# Patient Record
Sex: MALE | Race: WHITE | NOT HISPANIC OR LATINO | URBAN - METROPOLITAN AREA
[De-identification: names, ages, dates, MRNs, and addresses within clinical notes are randomized per-mention and may not be internally consistent; named-entity substitution may affect disease eponyms.]

---

## 2021-01-29 ENCOUNTER — RECORDS - HEALTHEAST (OUTPATIENT)
Dept: LAB | Facility: CLINIC | Age: 66
End: 2021-01-29

## 2021-01-29 LAB
ANION GAP SERPL CALCULATED.3IONS-SCNC: 13 MMOL/L (ref 5–18)
BUN SERPL-MCNC: 20 MG/DL (ref 8–22)
CALCIUM SERPL-MCNC: 9.2 MG/DL (ref 8.5–10.5)
CHLORIDE BLD-SCNC: 95 MMOL/L (ref 98–107)
CHOLEST SERPL-MCNC: 232 MG/DL
CO2 SERPL-SCNC: 24 MMOL/L (ref 22–31)
CREAT SERPL-MCNC: 1.09 MG/DL (ref 0.7–1.3)
FASTING STATUS PATIENT QL REPORTED: ABNORMAL
GFR SERPL CREATININE-BSD FRML MDRD: >60 ML/MIN/1.73M2
GLUCOSE BLD-MCNC: 437 MG/DL (ref 70–125)
HDLC SERPL-MCNC: 54 MG/DL
LDLC SERPL CALC-MCNC: 117 MG/DL
POTASSIUM BLD-SCNC: 4.1 MMOL/L (ref 3.5–5)
SODIUM SERPL-SCNC: 132 MMOL/L (ref 136–145)
TRIGL SERPL-MCNC: 303 MG/DL

## 2021-02-04 ENCOUNTER — RECORDS - HEALTHEAST (OUTPATIENT)
Dept: ADMINISTRATIVE | Facility: OTHER | Age: 66
End: 2021-02-04

## 2021-02-06 ENCOUNTER — COMMUNICATION - HEALTHEAST (OUTPATIENT)
Dept: SCHEDULING | Facility: CLINIC | Age: 66
End: 2021-02-06

## 2021-02-08 ENCOUNTER — AMBULATORY - HEALTHEAST (OUTPATIENT)
Dept: PULMONOLOGY | Facility: OTHER | Age: 66
End: 2021-02-08

## 2021-02-08 DIAGNOSIS — I26.99 PULMONARY EMBOLISM (H): ICD-10-CM

## 2021-02-11 ENCOUNTER — RECORDS - HEALTHEAST (OUTPATIENT)
Dept: LAB | Facility: CLINIC | Age: 66
End: 2021-02-11

## 2021-02-11 ENCOUNTER — RECORDS - HEALTHEAST (OUTPATIENT)
Dept: ADMINISTRATIVE | Facility: OTHER | Age: 66
End: 2021-02-11

## 2021-02-11 LAB
ANION GAP SERPL CALCULATED.3IONS-SCNC: 9 MMOL/L (ref 5–18)
BUN SERPL-MCNC: 12 MG/DL (ref 8–22)
CALCIUM SERPL-MCNC: 9.2 MG/DL (ref 8.5–10.5)
CHLORIDE BLD-SCNC: 101 MMOL/L (ref 98–107)
CO2 SERPL-SCNC: 26 MMOL/L (ref 22–31)
CREAT SERPL-MCNC: 0.85 MG/DL (ref 0.7–1.3)
GFR SERPL CREATININE-BSD FRML MDRD: >60 ML/MIN/1.73M2
GLUCOSE BLD-MCNC: 220 MG/DL (ref 70–125)
POTASSIUM BLD-SCNC: 4.3 MMOL/L (ref 3.5–5)
PSA SERPL-MCNC: 3.2 NG/ML (ref 0–4.5)
SODIUM SERPL-SCNC: 136 MMOL/L (ref 136–145)

## 2021-03-08 ENCOUNTER — RECORDS - HEALTHEAST (OUTPATIENT)
Dept: ADMINISTRATIVE | Facility: OTHER | Age: 66
End: 2021-03-08

## 2021-03-16 ENCOUNTER — RECORDS - HEALTHEAST (OUTPATIENT)
Dept: LAB | Facility: CLINIC | Age: 66
End: 2021-03-16

## 2021-03-16 LAB
ANION GAP SERPL CALCULATED.3IONS-SCNC: 10 MMOL/L (ref 5–18)
BUN SERPL-MCNC: 17 MG/DL (ref 8–22)
CALCIUM SERPL-MCNC: 9 MG/DL (ref 8.5–10.5)
CHLORIDE BLD-SCNC: 101 MMOL/L (ref 98–107)
CHOLEST SERPL-MCNC: 102 MG/DL
CO2 SERPL-SCNC: 26 MMOL/L (ref 22–31)
CREAT SERPL-MCNC: 0.82 MG/DL (ref 0.7–1.3)
CREAT UR-MCNC: 79 MG/DL
FASTING STATUS PATIENT QL REPORTED: NORMAL
GFR SERPL CREATININE-BSD FRML MDRD: >60 ML/MIN/1.73M2
GLUCOSE BLD-MCNC: 211 MG/DL (ref 70–125)
HDLC SERPL-MCNC: 48 MG/DL
LDLC SERPL CALC-MCNC: 39 MG/DL
MICROALBUMIN UR-MCNC: 0.95 MG/DL (ref 0–1.99)
MICROALBUMIN/CREAT UR: 12 MG/G
POTASSIUM BLD-SCNC: 4.1 MMOL/L (ref 3.5–5)
SODIUM SERPL-SCNC: 137 MMOL/L (ref 136–145)
TRIGL SERPL-MCNC: 73 MG/DL

## 2021-04-12 ENCOUNTER — HOSPITAL ENCOUNTER (OUTPATIENT)
Dept: CARDIOLOGY | Facility: HOSPITAL | Age: 66
Discharge: HOME | End: 2021-04-12
Attending: INTERNAL MEDICINE

## 2021-04-12 DIAGNOSIS — I26.99 PULMONARY EMBOLISM (H): ICD-10-CM

## 2021-04-12 LAB
AORTIC ROOT: 4.1 CM
AORTIC VALVE MEAN VELOCITY: 90.7 CM/S
ASCENDING AORTA: 3.9 CM
AV DIMENSIONLESS INDEX VTI: 0.8
AV MEAN GRADIENT: 4 MMHG
AV PEAK GRADIENT: 7.6 MMHG
AV VALVE AREA: 3.1 CM2
AV VELOCITY RATIO: 0.8
BSA FOR ECHO PROCEDURE: 1.98 M2
CV BLOOD PRESSURE: ABNORMAL MMHG
CV ECHO HEIGHT: 70 IN
CV ECHO WEIGHT: 175 LBS
DOP CALC AO PEAK VEL: 138 CM/S
DOP CALC AO VTI: 25.6 CM
DOP CALC LVOT AREA: 3.8 CM2
DOP CALC LVOT DIAMETER: 2.2 CM
DOP CALC LVOT PEAK VEL: 114 CM/S
DOP CALC LVOT STROKE VOLUME: 80.2 CM3
DOP CALCLVOT PEAK VEL VTI: 21.1 CM
EJECTION FRACTION: 65 % (ref 55–75)
FRACTIONAL SHORTENING: 37.2 % (ref 28–44)
INTERVENTRICULAR SEPTUM IN END DIASTOLE: 1.1 CM (ref 0.6–1)
IVS/PW RATIO: 1.4
LA AREA 1: 16 CM2
LA AREA 2: 15.6 CM2
LEFT ATRIUM AREA: 16 CM2
LEFT ATRIUM LENGTH: 5.05 CM
LEFT ATRIUM SIZE: 3 CM
LEFT ATRIUM VOLUME INDEX: 21.2 ML/M2
LEFT ATRIUM VOLUME: 42 ML
LEFT VENTRICLE CARDIAC INDEX: 3.3 L/MIN/M2
LEFT VENTRICLE CARDIAC OUTPUT: 6.5 L/MIN
LEFT VENTRICLE DIASTOLIC VOLUME INDEX: 42.4 CM3/M2 (ref 34–74)
LEFT VENTRICLE DIASTOLIC VOLUME: 84 CM3 (ref 62–150)
LEFT VENTRICLE HEART RATE: 81 BPM
LEFT VENTRICLE MASS INDEX: 67 G/M2
LEFT VENTRICLE SYSTOLIC VOLUME INDEX: 14.6 CM3/M2 (ref 11–31)
LEFT VENTRICLE SYSTOLIC VOLUME: 29 CM3 (ref 21–61)
LEFT VENTRICULAR INTERNAL DIMENSION IN DIASTOLE: 4.3 CM (ref 4.2–5.8)
LEFT VENTRICULAR INTERNAL DIMENSION IN SYSTOLE: 2.7 CM (ref 2.5–4)
LEFT VENTRICULAR MASS: 132.7 G
LEFT VENTRICULAR OUTFLOW TRACT MEAN GRADIENT: 2 MMHG
LEFT VENTRICULAR OUTFLOW TRACT MEAN VELOCITY: 68.2 CM/S
LEFT VENTRICULAR OUTFLOW TRACT PEAK GRADIENT: 5 MMHG
LEFT VENTRICULAR POSTERIOR WALL IN END DIASTOLE: 0.8 CM (ref 0.6–1)
LV STROKE VOLUME INDEX: 40.5 ML/M2
MITRAL VALVE E/A RATIO: 0.7
MV DECELERATION TIME: 208 MS
MV PEAK A VELOCITY: 77 CM/S
MV PEAK E VELOCITY: 51.3 CM/S
NUC REST DIASTOLIC VOLUME INDEX: 2800 LBS
NUC REST SYSTOLIC VOLUME INDEX: 70 IN
TRICUSPID REGURGITATION PEAK PRESSURE GRADIENT: 27.9 MMHG
TRICUSPID VALVE ANULAR PLANE SYSTOLIC EXCURSION: 1.8 CM
TRICUSPID VALVE PEAK REGURGITANT VELOCITY: 264 CM/S

## 2021-04-12 RX ORDER — LISINOPRIL 10 MG/1
10 TABLET ORAL DAILY
Status: SHIPPED | COMMUNITY
Start: 2021-02-11 | End: 2021-10-12 | Stop reason: SINTOL

## 2021-04-12 ASSESSMENT — MIFFLIN-ST. JEOR: SCORE: 1580.04

## 2021-04-19 ENCOUNTER — OFFICE VISIT - HEALTHEAST (OUTPATIENT)
Dept: PULMONOLOGY | Facility: OTHER | Age: 66
End: 2021-04-19

## 2021-04-19 DIAGNOSIS — I26.02 ACUTE SADDLE PULMONARY EMBOLISM WITH ACUTE COR PULMONALE (H): ICD-10-CM

## 2021-04-19 ASSESSMENT — MIFFLIN-ST. JEOR: SCORE: 1575.51

## 2021-06-05 VITALS
SYSTOLIC BLOOD PRESSURE: 116 MMHG | DIASTOLIC BLOOD PRESSURE: 70 MMHG | HEART RATE: 79 BPM | BODY MASS INDEX: 24.91 KG/M2 | HEIGHT: 70 IN | WEIGHT: 174 LBS | OXYGEN SATURATION: 99 %

## 2021-06-05 VITALS — BODY MASS INDEX: 25.05 KG/M2 | WEIGHT: 175 LBS | HEIGHT: 70 IN

## 2021-06-16 PROBLEM — I10 ESSENTIAL HYPERTENSION: Status: ACTIVE | Noted: 2021-04-12

## 2021-06-16 PROBLEM — I82.409 DEEP VENOUS THROMBOSIS OF LOWER EXTREMITY (H): Status: ACTIVE | Noted: 2021-04-12

## 2021-06-16 PROBLEM — E78.2 MIXED HYPERLIPIDEMIA: Status: ACTIVE | Noted: 2021-04-12

## 2021-06-16 PROBLEM — I26.99 BILATERAL PULMONARY EMBOLISM (H): Status: ACTIVE | Noted: 2021-02-04

## 2021-06-16 NOTE — PROGRESS NOTES
Assessment and Plan:Albert Forte is a 65 y.o. male with a past medical history significant for diabetes who presents to clinic today for follow up of a massive pulmonary embolism with pulmonary hypertesnion in February 2021.  His echo shows complete resolution of his pulmonary hypertension.  Presumably his clot was unprovoked however he is going to start a workup for colon cancer as his cologuard was positive.  I think at this point, its best to assume this was unprovoked until this is determined with biopsy.  In this case he should consider remaining on the blood thinner a minimum of 6-12 months, with a strong consideration of lifelong in a person with minimal risk of traumatic bleeding.    1) Pulmonary embolism - safe to assume this is resolved with 3 months of therapy, minimal symptoms, and no pulmonary hypertension on his ECHO  2) DVT status - this is also resolved, but it is not clear if provoked.  At a minimum of 3 months he can undergo a colonscopy with temporary suspension of the blood thinner for 3-5 days depending on the proceduralist.    3) Hypercoaguable workup - his Factor V leiden and Factor 2 were both negative.  If we decide at some point to stop the blood thinner we can consider protein C and S functional assays.  4) RTC in 6 months to reconvene - stay on the blood thinner until then, with holding for procedures as necessary..       CCx: pulmonary embolism    HPI: Mr. Forte is a 65 year old male with a new history of diabetes who presents for evaluation of a pulmonary embolism.  He was hospitalized Feb 4 to Feb 7 for a DVT and PE that was bilateral.  This resulted in pulmonary hypertension and he was treated with site directed lytic therapy.  He was not hypoxic.  He was ultmately discharged on Xarelto.  He has since recovered his symptoms essentially to baseline.  At his worse, he was dyspneic walking across the room, and now he has no limitations.  He had no history of travel, traumatic  injury to his leg, a known cancer diagnosis, or family history of clot or sudden, unexplained deaths.  He is a non smoker.  He has no physical hobbies that could result in injury and he is not receiving bruises while on the Xarelto.  He did have a cologuard that was positive AND he would like to have his cataracts fixed which got worse with his recent diabetes diagnosis.    ROS:  Review of Systems - History obtained from the patient  General ROS: negative, some weight loss  Psychological ROS: negative  ENT ROS: cataracts  Allergy and Immunology ROS: negative  Endocrine ROS: negative  Respiratory ROS: positive for -  negative for - cough, hemoptysis, orthopnea, pleuritic pain, shortness of breath, sputum changes, stridor, tachypnea or wheezing  Cardiovascular ROS: no chest pain or palpitations  Gastrointestinal ROS: no abdominal pain, change in bowel habits, or black or bloody stools  Genito-Urinary ROS: no dysuria, trouble voiding, or hematuria  Musculoskeletal ROS: negative  Neurological ROS: no TIA or stroke symptoms  Dermatological ROS:  Some itchiness since starting lisinopril and metformin    Family history  No family history of blood clots    Social history  Retired, , no smoking history    Current Meds:  Current Outpatient Medications   Medication Sig     glimepiride (AMARYL) 4 MG tablet Take 4 mg by mouth daily.     lisinopriL (PRINIVIL,ZESTRIL) 10 MG tablet Take 10 mg by mouth daily.     metFORMIN (GLUCOPHAGE) 500 MG tablet Take 2 tablets (1,000 mg total) by mouth 2 (two) times a day with meals. (Patient taking differently: Take by mouth Daily after breakfast. )     rivaroxaban ANTICOAGULANT (XARELTO) 15 mg (42)- 20 mg (9) dosepak Take 1 tablet (15mg) po bid for 21 days then take 1 tablet (20mg) daily       Labs:  No results found for this or any previous visit (from the past 72 hour(s)).    I have personally reviewed all pertinent imaging studies and PFT results unless otherwise noted.    Imaging  "studies:  Echo Complete    Result Date: 4/12/2021    Left ventricle ejection fraction is normal. The calculated left ventricular ejection fraction is 65% without wall motion abnormality.   Normal right ventricular size and systolic function.   No significant valve disease.   When compared to the previous study dated 2/5/2021, right ventricular systolic function has normalized. There is no evidence of pulmonary hypertension on the current study.      Patient Name: ERASMO MEIER   MR#: [id number]   Specimen #: [id number]   Collected: 2/6/2021 17:17   Received: 2/8/2021 10:07   Reported: 2/11/2021 15:49   Ordering Phy(s): VAMSI GLORIA     _________________________________________     TEST(S) REQUESTED:     Factor 5 Leiden and Factor 2 by PCR     SPECIMEN DESCRIPTION:   Blood     RESULTS:     Factor V 1691G>A (Leiden)  RESULTS:   Mutation analyzed:     1691G>A   Factor V 1691G>A (Leiden)  Interpretation:      ABSENT   Factor V 1691G>A (Leiden) mutation  genotype:      G/G     FACTOR 2/PROTHROMBIN RESULTS:   Mutation analyzed:     [id number]   Factor 2 Mutation Interpretation:      ABSENT   Factor 2 Mutation genotype:      G/G     INTERPRETATION:   The patient is negative for the Factor V 1691G>A (Leiden) and negative for the Factor 2 mutation.       Physical Exam:  /70   Pulse 79   Ht 5' 10\" (1.778 m)   Wt 174 lb (78.9 kg)   SpO2 99%   BMI 24.97 kg/m    General - Well nourished  Ears/Mouth -  Deferred given mask use during pandemic  Neck - no cervical lymphadenopathy  Lungs - Clear to ausculation bilaterally   CVS - regular rhythm with no murmurs, rubs or gallups  Abdomen - soft, NT, ND, NABS  Ext - no cyanosis, clubbing or edema  Skin - no rash  Psychology - alert and oriented, answers appropriate        Electronically signed by:    Angel Andino MD PhD  [facility] Pulmonary and Critical Care Medicine  "

## 2021-06-16 NOTE — PATIENT INSTRUCTIONS - HE
1) Your heart is functioning normally now.  This means there has been resolution of the pulmonary hypertension you had before  2) We don't for sure know what caused this clot.  If you have cancer, it could be that, but we can't be sure until you have your colonoscopy.  3) Since you have a low risk profile for bleeding, it may be safer to stay on the blood thinner to prevent future clots unless we have a clear cause that can be reversed (like cancer maybe).    4) You should proceed with the colonoscopy at a minimum of 3 months out from starting the blood thinner.  It may be safer to wait 4, but I wouldn't put off the colonoscopy too long.  5) I will check back in after another 6 months from now to discuss what to do with your blood thinner  6) The factor 2 and Factor V leiden tests were both negative.

## 2021-06-21 ENCOUNTER — RECORDS - HEALTHEAST (OUTPATIENT)
Dept: LAB | Facility: CLINIC | Age: 66
End: 2021-06-21

## 2021-06-21 NOTE — LETTER
Letter by Angel Andino MD at      Author: Angel Andino MD Service: -- Author Type: --    Filed:  Encounter Date: 4/19/2021 Status: (Other)         Migue Mendoza MD  [address]  [facility] [address]                                  April 19, 2021    Patient: Albert Forte   MR Number: [id number]   YOB: 1955   Date of Visit: 4/19/2021     Dear Dr. Kelly MD:    Thank you for referring Albert Forte to me for evaluation. Below are the relevant portions of my assessment and plan of care.    If you have questions, please do not hesitate to call me. I look forward to following Albert along with you.    Sincerely,        Angel Andino MD          CC  No Recipients  Angel Andino MD  4/19/2021  2:20 PM  Sign when Signing Visit  Assessment and Plan:Albert Forte is a 65 y.o. male with a past medical history significant for diabetes who presents to clinic today for follow up of a massive pulmonary embolism with pulmonary hypertesnion in February 2021.  His echo shows complete resolution of his pulmonary hypertension.  Presumably his clot was unprovoked however he is going to start a workup for colon cancer as his cologuard was positive.  I think at this point, its best to assume this was unprovoked until this is determined with biopsy.  In this case he should consider remaining on the blood thinner a minimum of 6-12 months, with a strong consideration of lifelong in a person with minimal risk of traumatic bleeding.    1) Pulmonary embolism - safe to assume this is resolved with 3 months of therapy, minimal symptoms, and no pulmonary hypertension on his ECHO  2) DVT status - this is also resolved, but it is not clear if provoked.  At a minimum of 3 months he can undergo a colonscopy with temporary suspension of the blood thinner for 3-5 days depending on the proceduralist.    3) Hypercoaguable workup - his Factor V leiden and Factor 2 were both  negative.  If we decide at some point to stop the blood thinner we can consider protein C and S functional assays.  4) RTC in 6 months to reconvene - stay on the blood thinner until then, with holding for procedures as necessary..       CCx: pulmonary embolism    HPI: Mr. Forte is a 65 year old male with a new history of diabetes who presents for evaluation of a pulmonary embolism.  He was hospitalized Feb 4 to Feb 7 for a DVT and PE that was bilateral.  This resulted in pulmonary hypertension and he was treated with site directed lytic therapy.  He was not hypoxic.  He was ultmately discharged on Xarelto.  He has since recovered his symptoms essentially to baseline.  At his worse, he was dyspneic walking across the room, and now he has no limitations.  He had no history of travel, traumatic injury to his leg, a known cancer diagnosis, or family history of clot or sudden, unexplained deaths.  He is a non smoker.  He has no physical hobbies that could result in injury and he is not receiving bruises while on the Xarelto.  He did have a cologuard that was positive AND he would like to have his cataracts fixed which got worse with his recent diabetes diagnosis.    ROS:  Review of Systems - History obtained from the patient  General ROS: negative, some weight loss  Psychological ROS: negative  ENT ROS: cataracts  Allergy and Immunology ROS: negative  Endocrine ROS: negative  Respiratory ROS: positive for -  negative for - cough, hemoptysis, orthopnea, pleuritic pain, shortness of breath, sputum changes, stridor, tachypnea or wheezing  Cardiovascular ROS: no chest pain or palpitations  Gastrointestinal ROS: no abdominal pain, change in bowel habits, or black or bloody stools  Genito-Urinary ROS: no dysuria, trouble voiding, or hematuria  Musculoskeletal ROS: negative  Neurological ROS: no TIA or stroke symptoms  Dermatological ROS:  Some itchiness since starting lisinopril and metformin    Family history  No family  history of blood clots    Social history  Retired, , no smoking history    Current Meds:  Current Outpatient Medications   Medication Sig   ? glimepiride (AMARYL) 4 MG tablet Take 4 mg by mouth daily.   ? lisinopriL (PRINIVIL,ZESTRIL) 10 MG tablet Take 10 mg by mouth daily.   ? metFORMIN (GLUCOPHAGE) 500 MG tablet Take 2 tablets (1,000 mg total) by mouth 2 (two) times a day with meals. (Patient taking differently: Take by mouth Daily after breakfast. )   ? rivaroxaban ANTICOAGULANT (XARELTO) 15 mg (42)- 20 mg (9) dosepak Take 1 tablet (15mg) po bid for 21 days then take 1 tablet (20mg) daily       Labs:  No results found for this or any previous visit (from the past 72 hour(s)).    I have personally reviewed all pertinent imaging studies and PFT results unless otherwise noted.    Imaging studies:  Echo Complete    Result Date: 4/12/2021    Left ventricle ejection fraction is normal. The calculated left ventricular ejection fraction is 65% without wall motion abnormality.   Normal right ventricular size and systolic function.   No significant valve disease.   When compared to the previous study dated 2/5/2021, right ventricular systolic function has normalized. There is no evidence of pulmonary hypertension on the current study.      Patient Name: ERASMO MEIER   MR#: [id number]   Specimen #: [id number]   Collected: 2/6/2021 17:17   Received: 2/8/2021 10:07   Reported: 2/11/2021 15:49   Ordering Phy(s): VAMSI GLORIA     _________________________________________     TEST(S) REQUESTED:     Factor 5 Leiden and Factor 2 by PCR     SPECIMEN DESCRIPTION:   Blood     RESULTS:     Factor V 1691G>A (Leiden)  RESULTS:   Mutation analyzed:     1691G>A   Factor V 1691G>A (Leiden)  Interpretation:      ABSENT   Factor V 1691G>A (Leiden) mutation  genotype:      G/G     FACTOR 2/PROTHROMBIN RESULTS:   Mutation analyzed:     [id number]   Factor 2 Mutation Interpretation:      ABSENT   Factor 2 Mutation genotype:      " G/G     INTERPRETATION:   The patient is negative for the Factor V 1691G>A (Leiden) and negative for the Factor 2 mutation.       Physical Exam:  /70   Pulse 79   Ht 5' 10\" (1.778 m)   Wt 174 lb (78.9 kg)   SpO2 99%   BMI 24.97 kg/m    General - Well nourished  Ears/Mouth -  Deferred given mask use during pandemic  Neck - no cervical lymphadenopathy  Lungs - Clear to ausculation bilaterally   CVS - regular rhythm with no murmurs, rubs or gallups  Abdomen - soft, NT, ND, NABS  Ext - no cyanosis, clubbing or edema  Skin - no rash  Psychology - alert and oriented, answers appropriate        Electronically signed by:    Angel Andino MD PhD  [facility] Pulmonary and Critical Care Medicine         "

## 2021-06-22 LAB
ALBUMIN SERPL-MCNC: 3.6 G/DL (ref 3.5–5)
ALP SERPL-CCNC: 74 U/L (ref 45–120)
ALT SERPL W P-5'-P-CCNC: 24 U/L (ref 0–45)
ANION GAP SERPL CALCULATED.3IONS-SCNC: 11 MMOL/L (ref 5–18)
AST SERPL W P-5'-P-CCNC: 17 U/L (ref 0–40)
BASOPHILS # BLD AUTO: 0 THOU/UL (ref 0–0.2)
BASOPHILS NFR BLD AUTO: 1 % (ref 0–2)
BILIRUB SERPL-MCNC: 0.3 MG/DL (ref 0–1)
BUN SERPL-MCNC: 22 MG/DL (ref 8–22)
CALCIUM SERPL-MCNC: 9.4 MG/DL (ref 8.5–10.5)
CHLORIDE BLD-SCNC: 101 MMOL/L (ref 98–107)
CO2 SERPL-SCNC: 27 MMOL/L (ref 22–31)
CREAT SERPL-MCNC: 0.77 MG/DL (ref 0.7–1.3)
EOSINOPHIL # BLD AUTO: 0.3 THOU/UL (ref 0–0.4)
EOSINOPHIL NFR BLD AUTO: 4 % (ref 0–6)
ERYTHROCYTE [DISTWIDTH] IN BLOOD BY AUTOMATED COUNT: 12.9 % (ref 11–14.5)
GFR SERPL CREATININE-BSD FRML MDRD: >60 ML/MIN/1.73M2
GLUCOSE BLD-MCNC: 90 MG/DL (ref 70–125)
HCT VFR BLD AUTO: 41.8 % (ref 40–54)
HGB BLD-MCNC: 14.2 G/DL (ref 14–18)
IMM GRANULOCYTES # BLD: 0 THOU/UL
IMM GRANULOCYTES NFR BLD: 0 %
LYMPHOCYTES # BLD AUTO: 1.7 THOU/UL (ref 0.8–4.4)
LYMPHOCYTES NFR BLD AUTO: 23 % (ref 20–40)
MCH RBC QN AUTO: 29.7 PG (ref 27–34)
MCHC RBC AUTO-ENTMCNC: 34 G/DL (ref 32–36)
MCV RBC AUTO: 87 FL (ref 80–100)
MONOCYTES # BLD AUTO: 0.7 THOU/UL (ref 0–0.9)
MONOCYTES NFR BLD AUTO: 9 % (ref 2–10)
NEUTROPHILS # BLD AUTO: 4.5 THOU/UL (ref 2–7.7)
NEUTROPHILS NFR BLD AUTO: 62 % (ref 50–70)
PLATELET # BLD AUTO: 230 THOU/UL (ref 140–440)
PMV BLD AUTO: 10.3 FL (ref 8.5–12.5)
POTASSIUM BLD-SCNC: 4.3 MMOL/L (ref 3.5–5)
PROT SERPL-MCNC: 6.6 G/DL (ref 6–8)
RBC # BLD AUTO: 4.78 MILL/UL (ref 4.4–6.2)
SODIUM SERPL-SCNC: 139 MMOL/L (ref 136–145)
WBC: 7.2 THOU/UL (ref 4–11)

## 2021-10-12 ENCOUNTER — OFFICE VISIT (OUTPATIENT)
Dept: PULMONOLOGY | Facility: OTHER | Age: 66
End: 2021-10-12
Payer: MEDICARE

## 2021-10-12 VITALS — OXYGEN SATURATION: 98 % | HEART RATE: 78 BPM | RESPIRATION RATE: 18 BRPM

## 2021-10-12 DIAGNOSIS — I26.99 BILATERAL PULMONARY EMBOLISM (H): ICD-10-CM

## 2021-10-12 PROCEDURE — 99213 OFFICE O/P EST LOW 20 MIN: CPT | Performed by: INTERNAL MEDICINE

## 2021-10-12 RX ORDER — LOSARTAN POTASSIUM 25 MG/1
25 TABLET ORAL DAILY
COMMUNITY

## 2021-10-12 NOTE — PROGRESS NOTES
Assessment and Plan:Albert Forte is a 66 year old male with a past medical history significant for bilateral pulmonary emboli and DVT who presents to clinic today for follow up.. He remains asymptomatic.  His clot was considered unprovoked.  He remains at low risk for bleeding on a blood thinner.  He would like to stay on this indefinitely which I agree with.    1) Hx of DVT/PE - unprovoked, remains at risk for recurrence.  Xarelto 20mg daily indefinitely.  Can stop 3 days in advance of procedures without bridging.  2) He can follow with Dr. Mendoza for refills from here.  3) Happy to see him back with any concerns.        CCx: Hx of DVT/PE    HPI: Mr. Forte is a 66 year old male who presents to discuss his treatment for a pulmonary embolism diagnosed in Feb 2021.  He initially had signs of pulmonary hypertension but this had resolved by his April clinic visit.  He has remained on Xarelto since that time.  Today he states he is doing great.  He is now more active and has regained some weight since having his diabetes stabilized and his diet modified.  He has no bruising or bleeding.  He had a colonoscopy off of his xarelto with multiple polyps removed.     ROS:  Review of Systems - History obtained from the patient  General ROS: negative  Psychological ROS: negative  ENT ROS: negative  Allergy and Immunology ROS: negative  Endocrine ROS: negative  Respiratory ROS: negative for - cough, hemoptysis, orthopnea, pleuritic pain, shortness of breath, sputum changes, stridor, tachypnea or wheezing  Cardiovascular ROS: no chest pain or palpitations  Gastrointestinal ROS: no abdominal pain, change in bowel habits, or black or bloody stools  Genito-Urinary ROS: no dysuria, trouble voiding, or hematuria  Musculoskeletal ROS: negative  Neurological ROS: no TIA or stroke symptoms  Dermatological ROS: negative      Current Meds:  Current Outpatient Medications   Medication Sig Dispense Refill     glimepiride (AMARYL) 4  MG tablet [GLIMEPIRIDE (AMARYL) 4 MG TABLET] Take 4 mg by mouth daily.       lisinopriL (PRINIVIL,ZESTRIL) 10 MG tablet [LISINOPRIL (PRINIVIL,ZESTRIL) 10 MG TABLET] Take 10 mg by mouth daily.       metFORMIN (GLUCOPHAGE) 500 MG tablet [METFORMIN (GLUCOPHAGE) 500 MG TABLET] Take 2 tablets (1,000 mg total) by mouth 2 (two) times a day with meals.  0     rivaroxaban ANTICOAGULANT (XARELTO) 15 mg (42)- 20 mg (9) dosepak [RIVAROXABAN ANTICOAGULANT (XARELTO) 15 MG (42)- 20 MG (9) DOSEPAK] Take 1 tablet (15mg) po bid for 21 days then take 1 tablet (20mg) daily 51 tablet 2       Labs:  @clab@  Lab Results   Component Value Date    WBC 7.2 06/21/2021    HGB 14.2 06/21/2021    HCT 41.8 06/21/2021     06/21/2021     06/21/2021    POTASSIUM 4.3 06/21/2021    CHLORIDE 101 06/21/2021    CO2 27 06/21/2021    GLC 90 06/21/2021    BUN 22 06/21/2021    CR 0.77 06/21/2021    MAG 1.9 02/04/2021    INR 1.03 02/04/2021    BILITOTAL 0.3 06/21/2021    AST 17 06/21/2021    ALT 24 06/21/2021    ALKPHOS 74 06/21/2021    PROTTOTAL 6.6 06/21/2021    ALBUMIN 3.6 06/21/2021       I have personally reviewed all pertinent imaging studies and PFT results unless otherwise noted.    Imaging studies:  Echocardiogram Complete    Result Date: 4/12/2021    Left ventricle ejection fraction is normal. The calculated left ventricular ejection fraction is 65% without wall motion abnormality.   Normal right ventricular size and systolic function.   No significant valve disease.   When compared to the previous study dated 2/5/2021, right ventricular systolic function has normalized. There is no evidence of pulmonary hypertension on the current study.          Physical Exam:  Pulse 78   Resp 18   SpO2 98%   General - Well nourished  Ears/Mouth -  Deferred given mask use during pandemic  Neck - no cervical lymphadenopathy  Lungs - Clear to ausculation bilaterally   CVS - regular rhythm with no murmurs, rubs or gallups  Abdomen - soft, NT, ND,  NABS  Ext - no cyanosis, clubbing or edema  Skin - no rash  Psychology - alert and oriented, answers appropriate        Electronically signed by:    Angel Andino MD PhD  [facility] Pulmonary and Critical Care Medicine

## 2021-10-12 NOTE — LETTER
10/12/2021       RE: Albert Forte  [address]  [place] [address]     Dear Colleague,    Thank you for referring your patient, Albert Forte, to the [facility] at [facility]. Please see a copy of my visit note below.    Assessment and Plan:Albert Forte is a 66 year old male with a past medical history significant for bilateral pulmonary emboli and DVT who presents to clinic today for follow up.. He remains asymptomatic.  His clot was considered unprovoked.  He remains at low risk for bleeding on a blood thinner.  He would like to stay on this indefinitely which I agree with.    1) Hx of DVT/PE - unprovoked, remains at risk for recurrence.  Xarelto 20mg daily indefinitely.  Can stop 3 days in advance of procedures without bridging.  2) He can follow with Dr. Mendoza for refills from here.  3) Happy to see him back with any concerns.        CCx: Hx of DVT/PE    HPI: Mr. Forte is a 66 year old male who presents to discuss his treatment for a pulmonary embolism diagnosed in Feb 2021.  He initially had signs of pulmonary hypertension but this had resolved by his April clinic visit.  He has remained on Xarelto since that time.  Today he states he is doing great.  He is now more active and has regained some weight since having his diabetes stabilized and his diet modified.  He has no bruising or bleeding.  He had a colonoscopy off of his xarelto with multiple polyps removed.     ROS:  Review of Systems - History obtained from the patient  General ROS: negative  Psychological ROS: negative  ENT ROS: negative  Allergy and Immunology ROS: negative  Endocrine ROS: negative  Respiratory ROS: negative for - cough, hemoptysis, orthopnea, pleuritic pain, shortness of breath, sputum changes, stridor, tachypnea or wheezing  Cardiovascular ROS: no chest pain or palpitations  Gastrointestinal ROS: no abdominal pain, change in bowel habits, or  black or bloody stools  Genito-Urinary ROS: no dysuria, trouble voiding, or hematuria  Musculoskeletal ROS: negative  Neurological ROS: no TIA or stroke symptoms  Dermatological ROS: negative      Current Meds:  Current Outpatient Medications   Medication Sig Dispense Refill     glimepiride (AMARYL) 4 MG tablet [GLIMEPIRIDE (AMARYL) 4 MG TABLET] Take 4 mg by mouth daily.       lisinopriL (PRINIVIL,ZESTRIL) 10 MG tablet [LISINOPRIL (PRINIVIL,ZESTRIL) 10 MG TABLET] Take 10 mg by mouth daily.       metFORMIN (GLUCOPHAGE) 500 MG tablet [METFORMIN (GLUCOPHAGE) 500 MG TABLET] Take 2 tablets (1,000 mg total) by mouth 2 (two) times a day with meals.  0     rivaroxaban ANTICOAGULANT (XARELTO) 15 mg (42)- 20 mg (9) dosepak [RIVAROXABAN ANTICOAGULANT (XARELTO) 15 MG (42)- 20 MG (9) DOSEPAK] Take 1 tablet (15mg) po bid for 21 days then take 1 tablet (20mg) daily 51 tablet 2       Labs:  @clab@  Lab Results   Component Value Date    WBC 7.2 06/21/2021    HGB 14.2 06/21/2021    HCT 41.8 06/21/2021     06/21/2021     06/21/2021    POTASSIUM 4.3 06/21/2021    CHLORIDE 101 06/21/2021    CO2 27 06/21/2021    GLC 90 06/21/2021    BUN 22 06/21/2021    CR 0.77 06/21/2021    MAG 1.9 02/04/2021    INR 1.03 02/04/2021    BILITOTAL 0.3 06/21/2021    AST 17 06/21/2021    ALT 24 06/21/2021    ALKPHOS 74 06/21/2021    PROTTOTAL 6.6 06/21/2021    ALBUMIN 3.6 06/21/2021       I have personally reviewed all pertinent imaging studies and PFT results unless otherwise noted.    Imaging studies:  Echocardiogram Complete    Result Date: 4/12/2021    Left ventricle ejection fraction is normal. The calculated left ventricular ejection fraction is 65% without wall motion abnormality.   Normal right ventricular size and systolic function.   No significant valve disease.   When compared to the previous study dated 2/5/2021, right ventricular systolic function has normalized. There is no evidence of pulmonary hypertension on the current study.           Physical Exam:  Pulse 78   Resp 18   SpO2 98%   General - Well nourished  Ears/Mouth -  Deferred given mask use during pandemic  Neck - no cervical lymphadenopathy  Lungs - Clear to ausculation bilaterally   CVS - regular rhythm with no murmurs, rubs or gallups  Abdomen - soft, NT, ND, NABS  Ext - no cyanosis, clubbing or edema  Skin - no rash  Psychology - alert and oriented, answers appropriate        Electronically signed by:    Angel Andino MD PhD  [facility] Pulmonary and Critical Care Medicine      Again, thank you for allowing me to participate in the care of your patient.      Sincerely,    Angel Andino MD

## 2021-10-12 NOTE — PATIENT INSTRUCTIONS
1) I think it is wise to stay on the Xarelto.  2) I will refill this  3) If you need procedures in the future, its ok to stop 3 days before and then restart the day after  4) Dr. Mendoza can continue to fill this for you

## 2022-02-04 ENCOUNTER — LAB REQUISITION (OUTPATIENT)
Dept: LAB | Facility: CLINIC | Age: 67
End: 2022-02-04
Payer: MEDICARE

## 2022-02-04 DIAGNOSIS — E11.9 TYPE 2 DIABETES MELLITUS WITHOUT COMPLICATIONS (H): ICD-10-CM

## 2022-02-04 LAB
ANION GAP SERPL CALCULATED.3IONS-SCNC: 9 MMOL/L (ref 5–18)
BUN SERPL-MCNC: 20 MG/DL (ref 8–22)
CALCIUM SERPL-MCNC: 9.3 MG/DL (ref 8.5–10.5)
CHLORIDE BLD-SCNC: 104 MMOL/L (ref 98–107)
CHOLEST SERPL-MCNC: 129 MG/DL
CO2 SERPL-SCNC: 27 MMOL/L (ref 22–31)
CREAT SERPL-MCNC: 0.93 MG/DL (ref 0.7–1.3)
FASTING STATUS PATIENT QL REPORTED: NORMAL
GFR SERPL CREATININE-BSD FRML MDRD: 90 ML/MIN/1.73M2
GLUCOSE BLD-MCNC: 194 MG/DL (ref 70–125)
HDLC SERPL-MCNC: 45 MG/DL
LDLC SERPL CALC-MCNC: 68 MG/DL
POTASSIUM BLD-SCNC: 4.2 MMOL/L (ref 3.5–5)
SODIUM SERPL-SCNC: 140 MMOL/L (ref 136–145)
TRIGL SERPL-MCNC: 78 MG/DL

## 2022-02-04 PROCEDURE — 80048 BASIC METABOLIC PNL TOTAL CA: CPT | Mod: ORL | Performed by: FAMILY MEDICINE

## 2022-02-04 PROCEDURE — 80061 LIPID PANEL: CPT | Mod: ORL | Performed by: FAMILY MEDICINE

## 2022-09-23 ENCOUNTER — LAB REQUISITION (OUTPATIENT)
Dept: LAB | Facility: CLINIC | Age: 67
End: 2022-09-23
Payer: MEDICARE

## 2022-09-23 DIAGNOSIS — I10 ESSENTIAL (PRIMARY) HYPERTENSION: ICD-10-CM

## 2022-09-23 DIAGNOSIS — Z12.5 ENCOUNTER FOR SCREENING FOR MALIGNANT NEOPLASM OF PROSTATE: ICD-10-CM

## 2022-09-23 DIAGNOSIS — E11.42 TYPE 2 DIABETES MELLITUS WITH DIABETIC POLYNEUROPATHY (H): ICD-10-CM

## 2022-09-23 LAB
ANION GAP SERPL CALCULATED.3IONS-SCNC: 7 MMOL/L (ref 7–15)
BUN SERPL-MCNC: 20 MG/DL (ref 8–23)
CALCIUM SERPL-MCNC: 9.2 MG/DL (ref 8.8–10.2)
CHLORIDE SERPL-SCNC: 102 MMOL/L (ref 98–107)
CREAT SERPL-MCNC: 0.86 MG/DL (ref 0.67–1.17)
CREAT UR-MCNC: 50 MG/DL
DEPRECATED HCO3 PLAS-SCNC: 30 MMOL/L (ref 22–29)
GFR SERPL CREATININE-BSD FRML MDRD: >90 ML/MIN/1.73M2
GLUCOSE SERPL-MCNC: 123 MG/DL (ref 70–99)
MICROALBUMIN UR-MCNC: <12 MG/L
MICROALBUMIN/CREAT UR: NORMAL MG/G{CREAT}
POTASSIUM SERPL-SCNC: 4.4 MMOL/L (ref 3.4–5.3)
PSA SERPL-MCNC: 3.08 NG/ML (ref 0–4.5)
SODIUM SERPL-SCNC: 139 MMOL/L (ref 136–145)

## 2022-09-23 PROCEDURE — 82043 UR ALBUMIN QUANTITATIVE: CPT | Mod: ORL | Performed by: FAMILY MEDICINE

## 2022-09-23 PROCEDURE — G0103 PSA SCREENING: HCPCS | Mod: ORL | Performed by: FAMILY MEDICINE

## 2022-09-23 PROCEDURE — 80048 BASIC METABOLIC PNL TOTAL CA: CPT | Mod: ORL | Performed by: FAMILY MEDICINE

## 2023-04-21 ENCOUNTER — LAB REQUISITION (OUTPATIENT)
Dept: LAB | Facility: CLINIC | Age: 68
End: 2023-04-21
Payer: MEDICARE

## 2023-04-21 DIAGNOSIS — E11.42 TYPE 2 DIABETES MELLITUS WITH DIABETIC POLYNEUROPATHY (H): ICD-10-CM

## 2023-04-21 LAB
ANION GAP SERPL CALCULATED.3IONS-SCNC: 10 MMOL/L (ref 7–15)
BUN SERPL-MCNC: 18.8 MG/DL (ref 8–23)
CALCIUM SERPL-MCNC: 9.2 MG/DL (ref 8.8–10.2)
CHLORIDE SERPL-SCNC: 105 MMOL/L (ref 98–107)
CREAT SERPL-MCNC: 0.87 MG/DL (ref 0.67–1.17)
DEPRECATED HCO3 PLAS-SCNC: 27 MMOL/L (ref 22–29)
GFR SERPL CREATININE-BSD FRML MDRD: >90 ML/MIN/1.73M2
GLUCOSE SERPL-MCNC: 104 MG/DL (ref 70–99)
POTASSIUM SERPL-SCNC: 4.1 MMOL/L (ref 3.4–5.3)
SODIUM SERPL-SCNC: 142 MMOL/L (ref 136–145)

## 2023-04-21 PROCEDURE — 80048 BASIC METABOLIC PNL TOTAL CA: CPT | Mod: ORL | Performed by: FAMILY MEDICINE

## 2023-11-06 ENCOUNTER — LAB REQUISITION (OUTPATIENT)
Dept: LAB | Facility: CLINIC | Age: 68
End: 2023-11-06
Payer: MEDICARE

## 2023-11-06 DIAGNOSIS — E11.42 TYPE 2 DIABETES MELLITUS WITH DIABETIC POLYNEUROPATHY (H): ICD-10-CM

## 2023-11-06 DIAGNOSIS — Z12.5 ENCOUNTER FOR SCREENING FOR MALIGNANT NEOPLASM OF PROSTATE: ICD-10-CM

## 2023-11-06 LAB
ANION GAP SERPL CALCULATED.3IONS-SCNC: 12 MMOL/L (ref 7–15)
BUN SERPL-MCNC: 17 MG/DL (ref 8–23)
CALCIUM SERPL-MCNC: 9.5 MG/DL (ref 8.8–10.2)
CHLORIDE SERPL-SCNC: 99 MMOL/L (ref 98–107)
CHOLEST SERPL-MCNC: 131 MG/DL
CREAT SERPL-MCNC: 0.94 MG/DL (ref 0.67–1.17)
CREAT UR-MCNC: 93.8 MG/DL
DEPRECATED HCO3 PLAS-SCNC: 26 MMOL/L (ref 22–29)
EGFRCR SERPLBLD CKD-EPI 2021: 88 ML/MIN/1.73M2
GLUCOSE SERPL-MCNC: 150 MG/DL (ref 70–99)
HDLC SERPL-MCNC: 53 MG/DL
LDLC SERPL CALC-MCNC: 67 MG/DL
MICROALBUMIN UR-MCNC: <12 MG/L
MICROALBUMIN/CREAT UR: NORMAL MG/G{CREAT}
NONHDLC SERPL-MCNC: 78 MG/DL
POTASSIUM SERPL-SCNC: 4.5 MMOL/L (ref 3.4–5.3)
PSA SERPL DL<=0.01 NG/ML-MCNC: 3.27 NG/ML (ref 0–4.5)
SODIUM SERPL-SCNC: 137 MMOL/L (ref 135–145)
TRIGL SERPL-MCNC: 57 MG/DL

## 2023-11-06 PROCEDURE — 80048 BASIC METABOLIC PNL TOTAL CA: CPT | Mod: ORL | Performed by: FAMILY MEDICINE

## 2023-11-06 PROCEDURE — G0103 PSA SCREENING: HCPCS | Mod: ORL | Performed by: FAMILY MEDICINE

## 2023-11-06 PROCEDURE — 82570 ASSAY OF URINE CREATININE: CPT | Mod: ORL | Performed by: FAMILY MEDICINE

## 2023-11-06 PROCEDURE — 80061 LIPID PANEL: CPT | Mod: ORL | Performed by: FAMILY MEDICINE

## 2024-07-30 ENCOUNTER — LAB REQUISITION (OUTPATIENT)
Dept: LAB | Facility: CLINIC | Age: 69
End: 2024-07-30
Payer: MEDICARE

## 2024-07-30 DIAGNOSIS — E11.42 TYPE 2 DIABETES MELLITUS WITH DIABETIC POLYNEUROPATHY (H): ICD-10-CM

## 2024-07-30 LAB
ANION GAP SERPL CALCULATED.3IONS-SCNC: 8 MMOL/L (ref 7–15)
BUN SERPL-MCNC: 19.5 MG/DL (ref 8–23)
CALCIUM SERPL-MCNC: 9 MG/DL (ref 8.8–10.4)
CHLORIDE SERPL-SCNC: 103 MMOL/L (ref 98–107)
CREAT SERPL-MCNC: 0.89 MG/DL (ref 0.67–1.17)
EGFRCR SERPLBLD CKD-EPI 2021: >90 ML/MIN/1.73M2
GLUCOSE SERPL-MCNC: 209 MG/DL (ref 70–99)
HCO3 SERPL-SCNC: 26 MMOL/L (ref 22–29)
POTASSIUM SERPL-SCNC: 4.4 MMOL/L (ref 3.4–5.3)
SODIUM SERPL-SCNC: 137 MMOL/L (ref 135–145)

## 2024-07-30 PROCEDURE — 80048 BASIC METABOLIC PNL TOTAL CA: CPT | Mod: ORL | Performed by: FAMILY MEDICINE

## 2024-12-27 ENCOUNTER — LAB REQUISITION (OUTPATIENT)
Dept: LAB | Facility: CLINIC | Age: 69
End: 2024-12-27
Payer: MEDICARE

## 2024-12-27 DIAGNOSIS — Z12.5 ENCOUNTER FOR SCREENING FOR MALIGNANT NEOPLASM OF PROSTATE: ICD-10-CM

## 2024-12-27 DIAGNOSIS — I10 ESSENTIAL (PRIMARY) HYPERTENSION: ICD-10-CM

## 2024-12-27 DIAGNOSIS — E78.2 MIXED HYPERLIPIDEMIA: ICD-10-CM

## 2024-12-27 DIAGNOSIS — E11.42 TYPE 2 DIABETES MELLITUS WITH DIABETIC POLYNEUROPATHY (H): ICD-10-CM

## 2024-12-27 LAB
ANION GAP SERPL CALCULATED.3IONS-SCNC: 11 MMOL/L (ref 7–15)
BUN SERPL-MCNC: 16.5 MG/DL (ref 8–23)
CALCIUM SERPL-MCNC: 9.3 MG/DL (ref 8.8–10.4)
CHLORIDE SERPL-SCNC: 103 MMOL/L (ref 98–107)
CHOLEST SERPL-MCNC: 128 MG/DL
CREAT SERPL-MCNC: 0.96 MG/DL (ref 0.67–1.17)
CREAT UR-MCNC: 56.7 MG/DL
EGFRCR SERPLBLD CKD-EPI 2021: 85 ML/MIN/1.73M2
FASTING STATUS PATIENT QL REPORTED: ABNORMAL
FASTING STATUS PATIENT QL REPORTED: NORMAL
GLUCOSE SERPL-MCNC: 122 MG/DL (ref 70–99)
HCO3 SERPL-SCNC: 25 MMOL/L (ref 22–29)
HDLC SERPL-MCNC: 55 MG/DL
LDLC SERPL CALC-MCNC: 62 MG/DL
MICROALBUMIN UR-MCNC: 22.6 MG/L
MICROALBUMIN/CREAT UR: 39.86 MG/G CR (ref 0–17)
NONHDLC SERPL-MCNC: 73 MG/DL
POTASSIUM SERPL-SCNC: 4.5 MMOL/L (ref 3.4–5.3)
PSA SERPL DL<=0.01 NG/ML-MCNC: 3.1 NG/ML (ref 0–6.5)
SODIUM SERPL-SCNC: 139 MMOL/L (ref 135–145)
TRIGL SERPL-MCNC: 54 MG/DL

## 2024-12-27 PROCEDURE — 82043 UR ALBUMIN QUANTITATIVE: CPT | Mod: ORL | Performed by: FAMILY MEDICINE

## 2024-12-27 PROCEDURE — G0103 PSA SCREENING: HCPCS | Mod: ORL | Performed by: FAMILY MEDICINE

## 2024-12-27 PROCEDURE — 80061 LIPID PANEL: CPT | Mod: ORL | Performed by: FAMILY MEDICINE

## 2024-12-27 PROCEDURE — 80048 BASIC METABOLIC PNL TOTAL CA: CPT | Mod: ORL | Performed by: FAMILY MEDICINE

## 2025-06-30 ENCOUNTER — LAB REQUISITION (OUTPATIENT)
Dept: LAB | Facility: CLINIC | Age: 70
End: 2025-06-30
Payer: COMMERCIAL

## 2025-06-30 DIAGNOSIS — E11.42 TYPE 2 DIABETES MELLITUS WITH DIABETIC POLYNEUROPATHY (H): ICD-10-CM

## 2025-06-30 LAB
ANION GAP SERPL CALCULATED.3IONS-SCNC: 12 MMOL/L (ref 7–15)
BUN SERPL-MCNC: 20.6 MG/DL (ref 8–23)
CALCIUM SERPL-MCNC: 9.2 MG/DL (ref 8.8–10.4)
CHLORIDE SERPL-SCNC: 100 MMOL/L (ref 98–107)
CREAT SERPL-MCNC: 0.97 MG/DL (ref 0.67–1.17)
EGFRCR SERPLBLD CKD-EPI 2021: 84 ML/MIN/1.73M2
GLUCOSE SERPL-MCNC: 223 MG/DL (ref 70–99)
HCO3 SERPL-SCNC: 26 MMOL/L (ref 22–29)
POTASSIUM SERPL-SCNC: 3.8 MMOL/L (ref 3.4–5.3)
SODIUM SERPL-SCNC: 138 MMOL/L (ref 135–145)

## 2025-06-30 PROCEDURE — 80048 BASIC METABOLIC PNL TOTAL CA: CPT | Mod: ORL | Performed by: FAMILY MEDICINE
